# Patient Record
Sex: FEMALE | Race: OTHER | HISPANIC OR LATINO | URBAN - METROPOLITAN AREA
[De-identification: names, ages, dates, MRNs, and addresses within clinical notes are randomized per-mention and may not be internally consistent; named-entity substitution may affect disease eponyms.]

---

## 2022-07-31 ENCOUNTER — EMERGENCY (EMERGENCY)
Facility: HOSPITAL | Age: 62
LOS: 1 days | Discharge: HOME | End: 2022-07-31
Attending: EMERGENCY MEDICINE | Admitting: EMERGENCY MEDICINE
Payer: MEDICAID

## 2022-07-31 VITALS
SYSTOLIC BLOOD PRESSURE: 119 MMHG | TEMPERATURE: 99 F | DIASTOLIC BLOOD PRESSURE: 73 MMHG | OXYGEN SATURATION: 98 % | RESPIRATION RATE: 18 BRPM | HEART RATE: 61 BPM

## 2022-07-31 VITALS
DIASTOLIC BLOOD PRESSURE: 83 MMHG | SYSTOLIC BLOOD PRESSURE: 149 MMHG | HEART RATE: 66 BPM | OXYGEN SATURATION: 97 % | RESPIRATION RATE: 18 BRPM | WEIGHT: 190.04 LBS | TEMPERATURE: 98 F

## 2022-07-31 DIAGNOSIS — I10 ESSENTIAL (PRIMARY) HYPERTENSION: ICD-10-CM

## 2022-07-31 DIAGNOSIS — R00.1 BRADYCARDIA, UNSPECIFIED: ICD-10-CM

## 2022-07-31 DIAGNOSIS — R20.0 ANESTHESIA OF SKIN: ICD-10-CM

## 2022-07-31 DIAGNOSIS — R51.9 HEADACHE, UNSPECIFIED: ICD-10-CM

## 2022-07-31 DIAGNOSIS — R73.03 PREDIABETES: ICD-10-CM

## 2022-07-31 DIAGNOSIS — E78.00 PURE HYPERCHOLESTEROLEMIA, UNSPECIFIED: ICD-10-CM

## 2022-07-31 LAB
ALBUMIN SERPL ELPH-MCNC: 5.1 G/DL — HIGH (ref 3.3–5)
ALP SERPL-CCNC: 87 U/L — SIGNIFICANT CHANGE UP (ref 40–120)
ALT FLD-CCNC: 20 U/L — SIGNIFICANT CHANGE UP (ref 10–45)
ANION GAP SERPL CALC-SCNC: 11 MMOL/L — SIGNIFICANT CHANGE UP (ref 5–17)
APTT BLD: 28.2 SEC — SIGNIFICANT CHANGE UP (ref 27.5–35.5)
AST SERPL-CCNC: 21 U/L — SIGNIFICANT CHANGE UP (ref 10–40)
BASOPHILS # BLD AUTO: 0.06 K/UL — SIGNIFICANT CHANGE UP (ref 0–0.2)
BASOPHILS NFR BLD AUTO: 0.5 % — SIGNIFICANT CHANGE UP (ref 0–2)
BILIRUB SERPL-MCNC: 0.4 MG/DL — SIGNIFICANT CHANGE UP (ref 0.2–1.2)
BUN SERPL-MCNC: 13 MG/DL — SIGNIFICANT CHANGE UP (ref 7–23)
CALCIUM SERPL-MCNC: 10 MG/DL — SIGNIFICANT CHANGE UP (ref 8.4–10.5)
CHLORIDE SERPL-SCNC: 101 MMOL/L — SIGNIFICANT CHANGE UP (ref 96–108)
CO2 SERPL-SCNC: 27 MMOL/L — SIGNIFICANT CHANGE UP (ref 22–31)
CREAT SERPL-MCNC: 0.71 MG/DL — SIGNIFICANT CHANGE UP (ref 0.5–1.3)
EGFR: 96 ML/MIN/1.73M2 — SIGNIFICANT CHANGE UP
EOSINOPHIL # BLD AUTO: 0.14 K/UL — SIGNIFICANT CHANGE UP (ref 0–0.5)
EOSINOPHIL NFR BLD AUTO: 1.3 % — SIGNIFICANT CHANGE UP (ref 0–6)
GLUCOSE SERPL-MCNC: 100 MG/DL — HIGH (ref 70–99)
HCT VFR BLD CALC: 42.6 % — SIGNIFICANT CHANGE UP (ref 34.5–45)
HGB BLD-MCNC: 15.1 G/DL — SIGNIFICANT CHANGE UP (ref 11.5–15.5)
IMM GRANULOCYTES NFR BLD AUTO: 0.3 % — SIGNIFICANT CHANGE UP (ref 0–1.5)
INR BLD: 1 — SIGNIFICANT CHANGE UP (ref 0.88–1.16)
LYMPHOCYTES # BLD AUTO: 2.84 K/UL — SIGNIFICANT CHANGE UP (ref 1–3.3)
LYMPHOCYTES # BLD AUTO: 25.5 % — SIGNIFICANT CHANGE UP (ref 13–44)
MCHC RBC-ENTMCNC: 32 PG — SIGNIFICANT CHANGE UP (ref 27–34)
MCHC RBC-ENTMCNC: 35.4 GM/DL — SIGNIFICANT CHANGE UP (ref 32–36)
MCV RBC AUTO: 90.3 FL — SIGNIFICANT CHANGE UP (ref 80–100)
MONOCYTES # BLD AUTO: 0.83 K/UL — SIGNIFICANT CHANGE UP (ref 0–0.9)
MONOCYTES NFR BLD AUTO: 7.4 % — SIGNIFICANT CHANGE UP (ref 2–14)
NEUTROPHILS # BLD AUTO: 7.25 K/UL — SIGNIFICANT CHANGE UP (ref 1.8–7.4)
NEUTROPHILS NFR BLD AUTO: 65 % — SIGNIFICANT CHANGE UP (ref 43–77)
NRBC # BLD: 0 /100 WBCS — SIGNIFICANT CHANGE UP (ref 0–0)
PLATELET # BLD AUTO: 296 K/UL — SIGNIFICANT CHANGE UP (ref 150–400)
POTASSIUM SERPL-MCNC: 3.6 MMOL/L — SIGNIFICANT CHANGE UP (ref 3.5–5.3)
POTASSIUM SERPL-SCNC: 3.6 MMOL/L — SIGNIFICANT CHANGE UP (ref 3.5–5.3)
PROT SERPL-MCNC: 7.9 G/DL — SIGNIFICANT CHANGE UP (ref 6–8.3)
PROTHROM AB SERPL-ACNC: 11.9 SEC — SIGNIFICANT CHANGE UP (ref 10.5–13.4)
RBC # BLD: 4.72 M/UL — SIGNIFICANT CHANGE UP (ref 3.8–5.2)
RBC # FLD: 12.6 % — SIGNIFICANT CHANGE UP (ref 10.3–14.5)
SODIUM SERPL-SCNC: 139 MMOL/L — SIGNIFICANT CHANGE UP (ref 135–145)
WBC # BLD: 11.15 K/UL — HIGH (ref 3.8–10.5)
WBC # FLD AUTO: 11.15 K/UL — HIGH (ref 3.8–10.5)

## 2022-07-31 PROCEDURE — 85730 THROMBOPLASTIN TIME PARTIAL: CPT

## 2022-07-31 PROCEDURE — 85025 COMPLETE CBC W/AUTO DIFF WBC: CPT

## 2022-07-31 PROCEDURE — 70450 CT HEAD/BRAIN W/O DYE: CPT | Mod: 26,MA

## 2022-07-31 PROCEDURE — 93010 ELECTROCARDIOGRAM REPORT: CPT

## 2022-07-31 PROCEDURE — 85610 PROTHROMBIN TIME: CPT

## 2022-07-31 PROCEDURE — 80053 COMPREHEN METABOLIC PANEL: CPT

## 2022-07-31 PROCEDURE — 70450 CT HEAD/BRAIN W/O DYE: CPT | Mod: MA

## 2022-07-31 PROCEDURE — 93005 ELECTROCARDIOGRAM TRACING: CPT

## 2022-07-31 PROCEDURE — 36415 COLL VENOUS BLD VENIPUNCTURE: CPT

## 2022-07-31 PROCEDURE — 83735 ASSAY OF MAGNESIUM: CPT

## 2022-07-31 PROCEDURE — 99285 EMERGENCY DEPT VISIT HI MDM: CPT | Mod: 25

## 2022-07-31 PROCEDURE — 99285 EMERGENCY DEPT VISIT HI MDM: CPT

## 2022-07-31 PROCEDURE — 84484 ASSAY OF TROPONIN QUANT: CPT

## 2022-07-31 NOTE — ED PROVIDER NOTE - OBJECTIVE STATEMENT
61F with a h/o high cholesterol, preDM, HTN who p/w HA and elevated blood pressure and left arm numbness 3 days ago, since resolved. She notes she had eaten a very salty meal prior to her sx. Currently denies HA, n/t/w in ext, CP/SOB, n/v or other complaints. She is compliant with her medications.

## 2022-07-31 NOTE — ED ADULT NURSE NOTE - OBJECTIVE STATEMENT
pt received into spot 3 A&Ox4 ambulatory appears comfortable arrives via walk in triage for eval of 8/10 head ache intermittent blurry vision x 3 days with left arm numbness that has since resolved. Hx htn and palpitations just moved from [place] has not established care with pmd yet but has appt for a week from now. Denies CP at present but reports intermittent sob. no facial droop slurred speech noted has b/l equal hand grasps on exam respirations unlabored speaks clear full sentences denies abd pain soft nondistended iv placed labs sent pending ED MD dennison

## 2022-07-31 NOTE — ED ADULT NURSE NOTE - CCCP TRG CHIEF CMPLNT
CSPINE negative, provider removed collar, all needs met at this time        Oscar Burnett RN  07/15/22 5033
Patient put on hour long breathing [id number] [id number]   Respiratory at the bedside      Yaya Portillo RN  07/15/22 [id number]
Patient walked with assist by two ED tech with O2 NC patient did good stats were 92% on O2 NC patient was winded and SOB stats dropped to 87% on NC O2   Ed doctor aware and will make a call to [facility] for possibility of admission       Baby Moritz  07/15/22 [id number]
headache

## 2022-07-31 NOTE — ED PROVIDER NOTE - NSFOLLOWUPINSTRUCTIONS_ED_ALL_ED_FT
Please follow up with your primary care physician next Tuesday,   Take your medications as directed.  Eat a healthy, low salt diet.         Sazonando sin sal    LO QUE NECESITA SABER:    ¿Por qué es importante sazonar alimentos sin sal?Sazonar sin sal cuando cocina y come los alimentos puede ayudar disminuir la cantidad de sodio en milian dieta. El sodio se encuentra en la sal y en muchos otros alimentos. Limite el sodio si tiene presión arterial david e insuficiencia cardiaca. Usted también deberá limitar el sodio si tiene problemas de hígado o enfermedad del riñón. El sodio hace que milian presión arterial suba si usted tiene presión arterial david. Si usted tiene tonya cardiaca, comer mucho sodio le causará retención de líquido en el cuerpo. Kaykay líquido extra en milian cuerpo puede causar hinchazón, falta de aliento, o ganancia de peso. Las personas con otras condiciones médicas deyanira diabetes o enfermedad cardiaca también necesitarán hacer otros cambios en la dieta. Consulte a milian médico si necesita hacer otros cambios en la dieta.    ¿Cuáles son los sazonadores y condimentos altos en sodio que robe limitar o evitar?  •El aderezo Dev[place], sopa, y otras mezclas de salsas empaquetadas.      •Salsas de barbacoa, tacos, y kayden.      •Mezclas de aderezos para ensaladas.      •Sal de ajo, cebolla, y apio.      •Trozos de tocino artificiales.      •Ablandadores y salsas para carne.      •Glutamato monosódio (GMS). El GMS puede encontrarse en comida china, salsa de soya, y salsa de ostión.      •Mostaza, salsa de rábano picante y ketchup.      •Salsa de vinagre.      •Oscar, sal sazonada, sal kosher, y sal quan.      •Soya, [place], y salsa de teriyaki. Limite las variedades bajas en oscar ya que aún contienen altas cantidades de sodio.      •Salsa tártara, de pescado, y coctel.      ¿Cuáles son las hierbas bajas en sodio que puedo usar?  •[place]bah[place] huevos, pescado y mariscos, carne de res, hígado, ternera, salsa de tomata, sopas, pasta, ensalada fab y vegetales.      •Laurelcon carne de res, pescado marcus, sopas y platillos con tomate.      •Cilantro, chile en polvo, y cominocon platillos con huevo, comida mexicana, cerdo, pescado y arroz.      •Hierba de dillcon panes, lulu, vegetales frescos cocidos, pepinos, pescado o mariscos, ensalada de lucas y sopa.      •Mejoranacon carne de res, calixto, lulu, pavo, pasta, ensalada fab, salsa cremosa, huevos, sopa y vegetales.      •Perejilcon relleno, arroz, huevo, ensalada fab, frijoles fritos, vegetales, sopas, y salsas de tomate.      •Means y tomillocon carne ternera, cerdo, res, lucas, salsa cremosa o de tomate, sopas y vegetales.      •Salviacon lulu, pavo, pescado, cerdo, ternera, sopas, cebollas, relleno, salsa de tomate y vegetales.      •Saborizantecon res, relleno, consome de lulu, ejotes, aves, kayden donovan y lucas.      •Estragóncon huevos, pescado o mariscos, lulu, pavo, ensalada fab, sopas, salsas y aderezos.      ¿Cuáles son las mezclas de hierbas bajas en sodio que puedo usar?  •Preparado de chili:mezcla la terese roby, chili en polvo, cilantro, cominos, mostaza seca, ajo en polvo, orégano y páprika.      •Mezcla de col:mezcle las semillas de apio, hierba de dill, cebolla seca, azúcar y estragón.      •Mezcla italiana:mezcle la [place], terese roby, ajo en polvo, pimiento claudia, mejorana, orégano, saborizante, y tomillo.      •Mezcla de cebolla con hierbas:mezcle la [place], terese roby, cominos, eneldo, hojuelas de cebolla seca y ajo en polvo.      ¿Cuáles son las especias bajas en sodio que puedo usar?  •Canelaen flanes y budines, panes dulces, arrollados, frutas, ensalada de frutas, cerdo, calabaza, zapallo y batatas.      •Clavosen pan jerel, fruta, jamón, puerco, frijoles fritos, tomates, lucas dulces.      •Currycon res, ternera, lulu, pavo, y pescado o sopa de papa.      •Jengibrecon pescado al horno, zanahorias, carne de res, jamón, lulu, pavo, arroz y fruta.      •Mazaen consomé de lulu, postres de frutas horneadas, zanahorias, coliflor, flan, mermelada de frutas, calixto, lucas y calabaza.      •Nuez moscadaen panificaciones dulces, frutas, verduras y flan.      ¿Cuáles son algunos sazonadores bajos en sodio que puedo usar?  •Cebollinesen huevos, pasta, sopa crema o de papa, maíz, lucas y aderezo.      •[place](allison, en polvo o en trozos) con mariscos, calixto, sopas, en dips y salsas, platillos italianos, kayden y aves.      •Limóncon lulu, ensaladas de fruta, pescado al horno o a la paolo, mariscos, espinacas o ensaladas revueltas.      •Cebolla(cruda, picada o en trozos) con res, hígado, ensalada de huevo, ensalada fab, guisados, pasta y guisos.      •Vinagre(deyanira balsmámico, de landen, condimentado, de vino tinto, o marcus) con pepinos, ejotes cocidos, lucas, aderezos, espinacas y comida de mar.      ¿Cómo puedo escoger los sazonadores bajos en sodio con las etiquetas de alimentos?Leer las etiquetas de los alimentos es kirill buena forma de aprender si los alimentos contienen o no sodio, y qué tanto sodio contienen. La lista de engredientes en la etiqueta le dirá si los sazonadores o alimentos contienen sodio. Un alimento contiene sodio si el ingrediente dice "Na" (símbolo del sodio), "salt", "soda" (bicarbonato), o "sodium" (sodio). Las etiquetas de los alimentos mencionan la cantidad de sodio en miligramos (mg). A continuación hay algunas palabras sobre el sodio que pueden aparecer en la etiqueta y milian significado. Solicite a milian médico más información sobre la forma de leer las etiquetas de los alimentos.  •Renée de sodio o sal:Menos de sue mg en cada ración.      •Sodio muy bajo:Treinta y sue (35) mg de sodio o menos en cada ración.      •Bajo sodio:Ciento cuarenta (140) mg de sodio o menos en cada ración.      •Menos sodio o reducido:Al menos 25 por ciento menos sodio en cada ración. Por ejemplo, un alimento puede tener 800 mg de sodio en cada ración. El mismo alimento echo con sodio reducido tendrá 600 mg de sodio.      •Bajo en sodio:Cincuenta (50) por ciento menos sodio en cada ración. Por ejemplo, un alimento puede tener 500 mg de sodio en cada ración. La misma comida con sodio reducido tendría 250 mg de sodio.      •Sin sal o sin sal adicional:No se le agrega sal.      ¿De qué otras formas puedo disminuir la cantidad de sodio?  •Consulte a milian médico antes de usar sustitutos de sal si necesita limitar el potasio en milian dieta. Pueden ser muy altos en potasio y no ser seguros para usted.      •Los alimentos empacados y de comida rápida son altos en sodio. Compre alimentos bajos en sal o sodio siempre que sea posible. Consuma alimentos y productos caseros o frescos para evitar el exceso de sodio. Compre verduras frescas, verduras congeladas o verduras enlatadas con bajo contenido de sodio o sin sal agregada.      •Evite las sopas enlatas o hechas de mezclas secas. Compre sopas bajas en sodio o swathi milian sopa en casa sin sal. Use sopas, caldos y consomés bajos en sal.      •Evite aves (lulu, pavo), pescado, o carne enlatados, ahumados, o procesados. Límite las kayden curadas deyanira tocino y jamón.      •El queso regular contiene kirill cantidad media o david de sal. Si usted come queso, compre los tipos bajos en sodio siempre que sea posible. Agregue sólo un tercio o la mitad de la cantidad de queso señalada en las recetas.      ACUERDOS SOBRE MILIAN CUIDADO:    Usted tiene el derecho de ayudar a planear milian cuidado. Para participar en kaykay plan, usted debe aprender acerca de kassandra problemas de tenzin y la forma de sazonar sin usar la sal. Luego, puede analizar las opciones de tratamiento con los médicos. Trabaje con ellos para decidir qué cuidados se usarán para tratarlo. Usted siempre tiene el derecho de rechazar el tratamiento.       © Copyright [employer] 2022         Return to the Emergency Department if you have any new or worsening symptoms, or for any other concerns. Please read below for further information.      Hypertension    Hypertension, commonly called high blood pressure, is when the force of blood pumping through your arteries is too strong. Hypertension forces your heart to work harder to pump blood. Your arteries may become narrow or stiff. Having untreated or uncontrolled hypertension for a long period of time can cause heart attack, stroke, kidney disease, and other problems. If started on a medication, take exactly as prescribed by your health care professional. Maintain a healthy lifestyle and follow up with your primary care physician.    SEEK IMMEDIATE MEDICAL CARE IF YOU HAVE ANY OF THE FOLLOWING SYMPTOMS: severe headache, confusion, chest pain, abdominal pain, vomiting, or shortness of breath.

## 2022-07-31 NOTE — ED PROVIDER NOTE - CLINICAL SUMMARY MEDICAL DECISION MAKING FREE TEXT BOX
61F with a h/o high cholesterol, preDM, HTN who p/w HA and elevated blood pressure and left arm numbness 3 days ago, since resolved. She notes she had eaten a very salty meal prior to her sx. Currently denies HA, n/t/w in ext, CP/SOB, n/v or other complaints. She is compliant with her medications.   Pt is well-appearing on exam, VSS, no focal neuro deficits, EKG NSR, no ischemia   Labs and CT head performed, no emergent pathology. Suspect sx due to HTN from eating salty meal. Pt advised to f/u with cards and neuro, dietary instructions given. Do not suspect ACS, PE, dissection or other acute life-threatening pathology at this time.  Pt feeling improved and is stable for DC. ED evaluation and management discussed with the patient in detail.  Close PMD follow up encouraged.  Strict ED return instructions discussed in detail and patient given the opportunity to ask any questions about their discharge diagnosis and instructions. Patient verbalized understanding.

## 2022-07-31 NOTE — ED ADULT TRIAGE NOTE - CHIEF COMPLAINT QUOTE
Pt c/o h/a. Pt endorses left arm numbness on Thursday, since resolved. Pt denies any new or worsening s/s in past 24 hours. Hx of HTN, compliant with medications.

## 2022-07-31 NOTE — ED PROVIDER NOTE - PHYSICAL EXAMINATION
GEN: Well appearing, well developed, awake, alert, oriented to person, place, time/situation and in no apparent distress. NTAF  ENT: Airway patent, Nasal mucosa clear. Mouth with normal mucosa.  EYES: Clear bilaterally. PERRL, EOMI  RESPIRATORY: Breathing comfortably with normal RR. No W/C/R, no hypoxia or resp distress.  CARDIAC: Regular rate and rhythm, no M/R/G  ABDOMEN: Soft, nontender, +bowel sounds, no rebound, rigidity, or guarding.  MSK: Range of motion is not limited, no deformities noted.  NEURO: Alert and oriented x 3. Cn 2-12 intact. Strength 5/5 and sensation intact in all 4 extremities. Gait normal.   SKIN: Skin normal color for race, warm, dry and intact. No evidence of rash.  PSYCH: Alert and oriented to person, place, time/situation. normal mood and affect. no apparent risk to self or others.

## 2022-07-31 NOTE — ED PROVIDER NOTE - PATIENT PORTAL LINK FT
You can access the FollowMyHealth Patient Portal offered by [facility] by registering at the following website: [email or web address]. By joining [email or web address]’s FollowMyHealth portal, you will also be able to view your health information using other applications (apps) compatible with our system.

## 2022-09-30 NOTE — ED PROVIDER NOTE - CHIEF COMPLAINT
Patient notified, per Lois at [facility], that no rx for therapy services is needed. [facility] reached out regarding seizure action plan. Advised that they reach out to neurology, who patient is seeing today. Patient was also advised to discuss seizure action plan with neurology at [facility] today.   The patient is a 61y Female complaining of headache.